# Patient Record
Sex: FEMALE | Race: WHITE | NOT HISPANIC OR LATINO | Employment: FULL TIME | URBAN - METROPOLITAN AREA
[De-identification: names, ages, dates, MRNs, and addresses within clinical notes are randomized per-mention and may not be internally consistent; named-entity substitution may affect disease eponyms.]

---

## 2017-01-03 ENCOUNTER — PATIENT MESSAGE (OUTPATIENT)
Dept: ENDOCRINOLOGY | Facility: CLINIC | Age: 62
End: 2017-01-03

## 2017-01-06 ENCOUNTER — LAB VISIT (OUTPATIENT)
Dept: LAB | Facility: HOSPITAL | Age: 62
End: 2017-01-06
Attending: INTERNAL MEDICINE
Payer: COMMERCIAL

## 2017-01-06 LAB
ALBUMIN SERPL BCP-MCNC: 3.5 G/DL
ALP SERPL-CCNC: 37 U/L
ALT SERPL W/O P-5'-P-CCNC: 10 U/L
ANION GAP SERPL CALC-SCNC: 8 MMOL/L
AST SERPL-CCNC: 13 U/L
BILIRUB SERPL-MCNC: 0.5 MG/DL
BUN SERPL-MCNC: 44 MG/DL
CALCIUM SERPL-MCNC: 9.2 MG/DL
CHLORIDE SERPL-SCNC: 105 MMOL/L
CO2 SERPL-SCNC: 27 MMOL/L
CREAT SERPL-MCNC: 2.2 MG/DL
EST. GFR  (AFRICAN AMERICAN): 26.7 ML/MIN/1.73 M^2
EST. GFR  (NON AFRICAN AMERICAN): 23.2 ML/MIN/1.73 M^2
ESTIMATED AVG GLUCOSE: 140 MG/DL
GLUCOSE SERPL-MCNC: 151 MG/DL
HBA1C MFR BLD HPLC: 6.5 %
POTASSIUM SERPL-SCNC: 5 MMOL/L
PROT SERPL-MCNC: 7.2 G/DL
SODIUM SERPL-SCNC: 140 MMOL/L

## 2017-01-06 PROCEDURE — 83036 HEMOGLOBIN GLYCOSYLATED A1C: CPT

## 2017-01-06 PROCEDURE — 80053 COMPREHEN METABOLIC PANEL: CPT

## 2017-01-06 PROCEDURE — 36415 COLL VENOUS BLD VENIPUNCTURE: CPT

## 2017-01-09 ENCOUNTER — OFFICE VISIT (OUTPATIENT)
Dept: ENDOCRINOLOGY | Facility: CLINIC | Age: 62
End: 2017-01-09
Payer: COMMERCIAL

## 2017-01-09 VITALS
WEIGHT: 293 LBS | BODY MASS INDEX: 48.82 KG/M2 | SYSTOLIC BLOOD PRESSURE: 124 MMHG | DIASTOLIC BLOOD PRESSURE: 80 MMHG | HEIGHT: 65 IN | HEART RATE: 68 BPM

## 2017-01-09 DIAGNOSIS — E11.42 DIABETIC POLYNEUROPATHY ASSOCIATED WITH TYPE 2 DIABETES MELLITUS: ICD-10-CM

## 2017-01-09 DIAGNOSIS — E11.21 DIABETIC NEPHROPATHY ASSOCIATED WITH TYPE 2 DIABETES MELLITUS: ICD-10-CM

## 2017-01-09 DIAGNOSIS — E66.01 OBESITY, MORBID, BMI 40.0-49.9: ICD-10-CM

## 2017-01-09 DIAGNOSIS — E03.9 HYPOTHYROIDISM, UNSPECIFIED TYPE: ICD-10-CM

## 2017-01-09 DIAGNOSIS — I10 ESSENTIAL HYPERTENSION: Chronic | ICD-10-CM

## 2017-01-09 DIAGNOSIS — E11.3599 PROLIFERATIVE DIABETIC RETINOPATHY WITHOUT MACULAR EDEMA ASSOCIATED WITH TYPE 2 DIABETES MELLITUS, UNSPECIFIED LATERALITY: ICD-10-CM

## 2017-01-09 DIAGNOSIS — I25.10 CORONARY ARTERY DISEASE, ANGINA PRESENCE UNSPECIFIED, UNSPECIFIED VESSEL OR LESION TYPE, UNSPECIFIED WHETHER NATIVE OR TRANSPLANTED HEART: Chronic | ICD-10-CM

## 2017-01-09 PROCEDURE — 99999 PR PBB SHADOW E&M-EST. PATIENT-LVL III: CPT | Mod: PBBFAC,,, | Performed by: INTERNAL MEDICINE

## 2017-01-09 PROCEDURE — 3060F POS MICROALBUMINURIA REV: CPT | Mod: S$GLB,,, | Performed by: INTERNAL MEDICINE

## 2017-01-09 PROCEDURE — 1159F MED LIST DOCD IN RCRD: CPT | Mod: S$GLB,,, | Performed by: INTERNAL MEDICINE

## 2017-01-09 PROCEDURE — 3044F HG A1C LEVEL LT 7.0%: CPT | Mod: S$GLB,,, | Performed by: INTERNAL MEDICINE

## 2017-01-09 PROCEDURE — 99214 OFFICE O/P EST MOD 30 MIN: CPT | Mod: S$GLB,,, | Performed by: INTERNAL MEDICINE

## 2017-01-09 PROCEDURE — 3079F DIAST BP 80-89 MM HG: CPT | Mod: S$GLB,,, | Performed by: INTERNAL MEDICINE

## 2017-01-09 PROCEDURE — 3074F SYST BP LT 130 MM HG: CPT | Mod: S$GLB,,, | Performed by: INTERNAL MEDICINE

## 2017-01-09 RX ORDER — AMLODIPINE BESYLATE 5 MG/1
5 TABLET ORAL DAILY
COMMUNITY

## 2017-01-09 RX ORDER — ATORVASTATIN CALCIUM 40 MG/1
40 TABLET, FILM COATED ORAL DAILY
COMMUNITY

## 2017-01-09 NOTE — MR AVS SNAPSHOT
[facility] - Endo/Diab/Metab  [address]  [facility] [address]  Phone: [phone]  Fax: [phone]                  Rochelle Reed   2017 9:30 AM   Office Visit    Description:  Female : 1955   Provider:  Michael Olmstead MD   Department:  [facility] Endo/Diab/Metab           Reason for Visit     Diabetes Mellitus           Diagnoses this Visit        Comments    Uncontrolled type 2 diabetes mellitus with diabetic nephropathy, with long-term current use of insulin    -  Primary     Diabetic nephropathy associated with type 2 diabetes mellitus         Proliferative diabetic retinopathy without macular edema associated with type 2 diabetes mellitus, unspecified laterality         Hypothyroidism, unspecified type         Essential hypertension         Coronary artery disease, angina presence unspecified, unspecified vessel or lesion type, unspecified whether native or transplanted heart                To Do List           Future Appointments        Provider Department Dept Phone    1/10/2017 9:30 AM LAB, [facility] [place] - [facility] [phone]      Goals (5 Years of Data)     None      [facility]     [facility] On Call Nurse Care [facility] -  Assistance  Registered nurses in the [facility] provide clinical advisement, health education, appointment booking, and other advisory services.  Call for this free service at [phone].             Medications           Message regarding Medications     Verify the changes and/or additions to your medication regime listed below are the same as discussed with your clinician today.  If any of these changes or additions are incorrect, please notify your healthcare provider.             Verify that the below list of medications is an accurate representation of the medications you are currently taking.  If none reported, the list may be blank. If incorrect, please contact your healthcare provider. Carry this list with you in case of  "emergency.           Current Medications     amlodipine (NORVASC) 5 MG tablet Take 5 mg by mouth once daily.    aspirin 325 MG tablet 1 Tablet(s) Oral. Every day.      atorvastatin (LIPITOR) 40 MG tablet Take 40 mg by mouth once daily.    blood sugar diagnostic (ONETOUCH ULTRA TEST) Strp TEST BLOOD SUGAR LEVELS FOUR TIMES DAILY TO SIX TIMES A DAY    calcitRIOL (ROCALTROL) 0.25 MCG Cap Take 0.25 mcg by mouth 3 (three) times a week.    carvedilol (COREG) 6.25 MG tablet Take 6.25 mg by mouth 2 (two) times daily.     clopidogrel (PLAVIX) 75 mg tablet Take 75 mg by mouth once daily.    coenzyme Q10 (CO Q-10) 200 mg capsule 1 Capsule(s) Oral.  Twice a day.      ferrous sulfate 325 mg (65 mg iron) Tab tablet Take 1 tablet (325 mg total) by mouth once daily.    furosemide (LASIX) 40 MG tablet TAKE 1 TABLET BY MOUTH ONCE DAILY    insulin aspart (NOVOLOG FLEXPEN) 100 unit/mL InPn pen INJECT 25 UNITS AT BREAKFAST AND LUNCH AND 35 UNITS AT DINNER    insulin detemir (LEVEMIR FLEXTOUCH) 100 unit/mL (3 mL) SubQ InPn pen INJECT 55 UNITS UNDER THE SKIN TWICE DAILY OR AS DIRECTED    levothyroxine (SYNTHROID) 100 MCG tablet Take 1 tablet (100 mcg total) by mouth once daily.    NITROSTAT 0.3 mg SL tablet     omega-3 fatty acids 1,000 mg Cap Take 1 capsule by mouth Daily. 1 Capsule Oral Every day    pen needle, diabetic (NOVOFINE 30) 30 gauge x 1/3" Ndle USE TO INJECT 5 TIMES DAILY    brimonidine 0.2% (ALPHAGAN) 0.2 % Drop Place 1 drop into the right eye 2 (two) times daily.    cycloSPORINE (RESTASIS) 0.05 % ophthalmic emulsion Place 0.05 mLs (1 drop total) into both eyes 2 (two) times daily.    FLUVIRIN [id number] 45 mcg (15 mcg x 3)/0.5 mL Susp     GLUCOSAMINE HCL/GLUC MARTINI (GLUCOSAMINE COMPLEX ORAL) Take 1 tablet by mouth 2 (two) times daily.     KIONEX 15 gram/60 mL Susp     multivitamin (THERAGRAN) per tablet 1 Tablet(s) Oral. Every day.      pitavastatin (LIVALO) 4 mg Tab Take 1 tablet by mouth once daily.           Clinical " "Reference Information           Vital Signs - Last Recorded  Most recent update: 1/9/2017 10:00 AM by Harriett Melendez MA    BP Pulse Ht Wt BMI    124/80 68 5' 5" (1.651 m) 134.7 kg (296 lb 15.4 oz) 49.42 kg/m2      Blood Pressure          Most Recent Value    BP  124/80      Allergies as of 1/9/2017     Ketoconazole      Immunizations Administered on Date of Encounter - 1/9/2017     None      Orders Placed During Today's Visit      Normal Orders This Visit    Ambulatory consult to Optometry     Future Labs/Procedures Expected by Expires    CBC auto differential  1/9/2017 3/10/2018    Hemoglobin A1c  1/9/2017 3/10/2018    TSH  1/9/2017 3/10/2018      "

## 2017-01-09 NOTE — PROGRESS NOTES
Subjective:      Patient ID: Rochelle Sahu is a 61 y.o. female.    Chief Complaint:  Diabetes Mellitus      History of Present Illness  Ms. Sahu presents for follow up of type 2 diabetes. Last seen by CHANCE Lyle on 4/8/2016.    T2DM complications:  Retinopathy - last eye exam 1/2016  Neuropathy - has some numbness/tingling in toes, no foot ulcers  Nephropathy - has CKD stage 4, saw nephrologist last week at , stopped ACEI due to hyperkalemia    She was diagnosed with diabetes in her early 40s     DE 03/2015 with Gayle Schultz RN, CDE    Current Regimen:  Lantus 50 units BID  Humalog I:CHO 1:5, ISF 1:10    Self Reported Glucose levels:  AM: 115-200  Lunch:   Dinner: 140-150     Pt is following low potassium diet per nephrologist.  Weight has been stable  Does some occasional walking at lunchtime at work.    Eating vegan now and trying to avoid excess starch.    no polydipsia or polyuria     She is on lt4 100 mcg qd for hypothyroidism    Started on calcitriol in the last year for secondary hyperparathyroidism - managed by nephrology    Review of Systems   Constitutional: Negative for chills and fever.   Gastrointestinal: Negative for nausea.   No CP  No SOB    Objective:   Physical Exam   Nursing note and vitals reviewed.  Feet no cuts or  scratches  Shoes appropriate  Decreased vibration sense in bilateral feet  + 1 pitting edema in lower ext  No thyromegaly    Lab Review:   Results for ROCHELLE SAHU (MRN [id number]) as of 1/9/2017 10:24   Ref. Range 1/6/2017 10:00   Sodium Latest Ref Range: 136 - 145 mmol/L 140   Potassium Latest Ref Range: 3.5 - 5.1 mmol/L 5.0   Chloride Latest Ref Range: 95 - 110 mmol/L 105   CO2 Latest Ref Range: 23 - 29 mmol/L 27   Anion Gap Latest Ref Range: 8 - 16 mmol/L 8   BUN, Bld Latest Ref Range: 8 - 23 mg/dL 44 (H)   Creatinine Latest Ref Range: 0.5 - 1.4 mg/dL 2.2 (H)   eGFR if non African American Latest Ref Range: >60 mL/min/1.73 m^2 23.2 (A)   eGFR if  Latest  Ref Range: >60 mL/min/1.73 m^2 26.7 (A)   Glucose Latest Ref Range: 70 - 110 mg/dL 151 (H)   Calcium Latest Ref Range: 8.7 - 10.5 mg/dL 9.2   Alkaline Phosphatase Latest Ref Range: 55 - 135 U/L 37 (L)   Total Protein Latest Ref Range: 6.0 - 8.4 g/dL 7.2   Albumin Latest Ref Range: 3.5 - 5.2 g/dL 3.5   Total Bilirubin Latest Ref Range: 0.1 - 1.0 mg/dL 0.5   AST Latest Ref Range: 10 - 40 U/L 13   ALT Latest Ref Range: 10 - 44 U/L 10     Results for ANTONIO SAHU (MRN [id number]) as of 1/9/2017 10:24   Ref. Range 1/26/2015 10:45 4/30/2015 09:37 7/29/2015 09:47 4/5/2016 10:40 1/6/2017 10:00   Hemoglobin A1C Latest Ref Range: 4.5 - 6.2 % 7.6 (H) 8.4 (H) 7.9 (H) 6.8 (H) 6.5 (H)   Estimated Avg Glucose Latest Ref Range: 68 - 131 mg/dL 171 (H) 194 (H) 180 (H) 148 (H) 140 (H)       Assessment:     1. Uncontrolled type 2 diabetes mellitus with diabetic nephropathy, with long-term current use of insulin    2. Diabetic nephropathy associated with type 2 diabetes mellitus    3. Proliferative diabetic retinopathy without macular edema associated with type 2 diabetes mellitus, unspecified laterality    4. Hypothyroidism, unspecified type    5. Essential hypertension    6. Coronary artery disease, angina presence unspecified, unspecified vessel or lesion type, unspecified whether native or transplanted heart    7. Diabetic polyneuropathy associated with type 2 diabetes mellitus    8. Obesity, morbid, BMI 40.0-49.9        Plan:     1.) Patient with type 2 diabetes that is reasonably controlled  --Recent A1c was 6.5% in the setting of anemia so true A1c probably b/w 7.0-7.5%  --A1c goal <7.5%  --Will continue Lantus 50 units BID  --Will continue Humalog at I:CHO of 1:5 and ISF 1:10    2.) Better long term blood glucose control to prevent progression  --Followed by nephrology at [facility]  --ACEI stopped due to hyperkalemia    3.) Better long term blood glucose control to prevent progression  --Due for eye exam now, will schedule    4.)  Continue levothyroxine 100 mcg PO daily  --Will check TSH now    5.)  BP stable on current regimen    6.) Avoid hypoglycemia    7.) Better long term blood glucose control to prevent progression  --Routine foot care    8.) Diet and exercise  --Increases insulin resistance    RTC in 6 months with A1c and CBC prior to appt    Michael Olmstead M.D. Staff Endocrinology

## 2017-01-10 ENCOUNTER — LAB VISIT (OUTPATIENT)
Dept: LAB | Facility: HOSPITAL | Age: 62
End: 2017-01-10
Attending: INTERNAL MEDICINE
Payer: COMMERCIAL

## 2017-01-10 DIAGNOSIS — E03.9 HYPOTHYROIDISM, UNSPECIFIED TYPE: ICD-10-CM

## 2017-01-10 DIAGNOSIS — E11.3599 PROLIFERATIVE DIABETIC RETINOPATHY WITHOUT MACULAR EDEMA ASSOCIATED WITH TYPE 2 DIABETES MELLITUS, UNSPECIFIED LATERALITY: ICD-10-CM

## 2017-01-10 DIAGNOSIS — I25.10 CORONARY ARTERY DISEASE, ANGINA PRESENCE UNSPECIFIED, UNSPECIFIED VESSEL OR LESION TYPE, UNSPECIFIED WHETHER NATIVE OR TRANSPLANTED HEART: Chronic | ICD-10-CM

## 2017-01-10 DIAGNOSIS — I10 ESSENTIAL HYPERTENSION: Chronic | ICD-10-CM

## 2017-01-10 DIAGNOSIS — E11.21 DIABETIC NEPHROPATHY ASSOCIATED WITH TYPE 2 DIABETES MELLITUS: ICD-10-CM

## 2017-01-10 LAB — TSH SERPL DL<=0.005 MIU/L-ACNC: 2.13 UIU/ML

## 2017-01-10 PROCEDURE — 84443 ASSAY THYROID STIM HORMONE: CPT

## 2017-01-10 PROCEDURE — 36415 COLL VENOUS BLD VENIPUNCTURE: CPT | Mod: PO

## 2017-02-07 ENCOUNTER — OFFICE VISIT (OUTPATIENT)
Dept: OPTOMETRY | Facility: CLINIC | Age: 62
End: 2017-02-07
Payer: COMMERCIAL

## 2017-02-07 DIAGNOSIS — H40.1132 PRIMARY OPEN ANGLE GLAUCOMA OF BOTH EYES, MODERATE STAGE: ICD-10-CM

## 2017-02-07 DIAGNOSIS — E11.3593 PROLIFERATIVE DIABETIC RETINOPATHY OF BOTH EYES WITHOUT MACULAR EDEMA ASSOCIATED WITH TYPE 2 DIABETES MELLITUS: Primary | ICD-10-CM

## 2017-02-07 PROCEDURE — 99999 PR PBB SHADOW E&M-EST. PATIENT-LVL II: CPT | Mod: PBBFAC,,, | Performed by: OPTOMETRIST

## 2017-02-07 PROCEDURE — 92014 COMPRE OPH EXAM EST PT 1/>: CPT | Mod: S$GLB,,, | Performed by: OPTOMETRIST

## 2017-02-07 RX ORDER — HEPATITIS B VACCINE (RECOMBINANT) 20 UG/ML
INJECTION, SUSPENSION INTRAMUSCULAR
COMMUNITY
Start: 2016-11-04 | End: 2017-09-05

## 2017-02-07 NOTE — PROGRESS NOTES
HPI     DSL- 01/13/2016   Diabetic eye exam  Pt states no vision change. Vision is stable  Dr. CEE took patient off meds.   Eyemeds  systane OU PRN       Last edited by Kraig Dimas, OD on 2/7/2017 12:08 PM.     ROS     Negative for: Constitutional, Gastrointestinal, Neurological, Skin,   Genitourinary, Musculoskeletal, HENT, Endocrine, Cardiovascular, Eyes,   Respiratory, Psychiatric, Allergic/Imm, Heme/Lymph    Last edited by Kraig Dimas, OD on 2/7/2017 10:57 AM. (History)        Assessment /Plan     For exam results, see Encounter Report.    Proliferative diabetic retinopathy of both eyes without macular edema associated with type 2 diabetes mellitus    Primary open angle glaucoma of both eyes, moderate stage  -     Cruz Visual Field - OU - Intermediate - Both Eyes; Future  -     OCT - Optic Nerve; Future      1. S/P PRP OD, some mild non prolif changes at this time. Mild diabetic retinopathy, no csme. Return in 1 year for dilated eye exam.  2. IOP in teens, may need to restart drops, order HVF(24-2)angelique std and OCT , recheck IOP and decide on restarting drops.

## 2017-04-26 ENCOUNTER — CLINICAL SUPPORT (OUTPATIENT)
Dept: OPHTHALMOLOGY | Facility: CLINIC | Age: 62
End: 2017-04-26
Payer: COMMERCIAL

## 2017-04-26 ENCOUNTER — OFFICE VISIT (OUTPATIENT)
Dept: OPTOMETRY | Facility: CLINIC | Age: 62
End: 2017-04-26
Payer: COMMERCIAL

## 2017-04-26 DIAGNOSIS — H40.1132 PRIMARY OPEN ANGLE GLAUCOMA OF BOTH EYES, MODERATE STAGE: ICD-10-CM

## 2017-04-26 DIAGNOSIS — E11.3593 PROLIFERATIVE DIABETIC RETINOPATHY OF BOTH EYES WITHOUT MACULAR EDEMA ASSOCIATED WITH TYPE 2 DIABETES MELLITUS: ICD-10-CM

## 2017-04-26 DIAGNOSIS — H40.1132 PRIMARY OPEN ANGLE GLAUCOMA OF BOTH EYES, MODERATE STAGE: Primary | ICD-10-CM

## 2017-04-26 PROCEDURE — 92012 INTRM OPH EXAM EST PATIENT: CPT | Mod: S$GLB,,, | Performed by: OPTOMETRIST

## 2017-04-26 PROCEDURE — 92133 CPTRZD OPH DX IMG PST SGM ON: CPT | Mod: S$GLB,,, | Performed by: OPTOMETRIST

## 2017-04-26 PROCEDURE — 92082 INTERMEDIATE VISUAL FIELD XM: CPT | Mod: S$GLB,,, | Performed by: OPTOMETRIST

## 2017-04-26 PROCEDURE — 99999 PR PBB SHADOW E&M-EST. PATIENT-LVL II: CPT | Mod: PBBFAC,,, | Performed by: OPTOMETRIST

## 2017-04-26 NOTE — PROGRESS NOTES
HPI     Here to check iop and tests, consider restart drops.   Here for HVF, OCT, IOP today. Not using any drops  Diabetic  this   AM. History of diabetic retinopathy Vision changes with BS.  Hemoglobin A1C       Date                     Value               Ref Range             Status                01/06/2017               6.5 (H)             4.5 - 6.2 %           Final              Comment:    According to ADA guidelines, hemoglobin A1C <7.0% represents  optimal   control in non-pregnant diabetic patients.  Different  metrics may apply   to specific populations.   Standards of Medical Care in Diabetes -   2016.  For the purpose of screening for the presence of diabetes:  <5.7%       Consistent with the absence of diabetes  5.7-6.4%  Consistent with   increasing risk for diabetes   (prediabetes)  >or=6.5%  Consistent with   diabetes  Currently no consensus exists for use of hemoglobin A1C  for   diagnosis of diabetes for children.         04/05/2016               6.8 (H)             4.5 - 6.2 %           Final                 07/29/2015               7.9 (H)             4.5 - 6.2 %           Final            ----------       Last edited by Kraig Dimas, OD on 4/26/2017 11:31 AM.     ROS     Negative for: Constitutional, Gastrointestinal, Neurological, Skin,   Genitourinary, Musculoskeletal, HENT, Endocrine, Cardiovascular, Eyes,   Respiratory, Psychiatric, Allergic/Imm, Heme/Lymph    Last edited by Kraig Dimas, OD on 4/26/2017 11:21 AM. (History)        Assessment /Plan     For exam results, see Encounter Report.    Primary open angle glaucoma of both eyes, moderate stage    Proliferative diabetic retinopathy of both eyes without macular edema associated with type 2 diabetes mellitus      1. Increase in IOP today. Will recheck IOP in 1-2 mos, if still up will restart on Brimonidine. Prev stopped by Dr. CEE after cat surgery when IOP came down. Pachy thick, no family history of glaucoma, OCT  with thinning OU and VF with defects, some relation to laser for PDR in past. Target IOP 14-16.   2. S/P PRP. Redilate at next visit.

## 2017-05-25 ENCOUNTER — TELEPHONE (OUTPATIENT)
Dept: ENDOCRINOLOGY | Facility: CLINIC | Age: 62
End: 2017-05-25

## 2017-05-25 NOTE — TELEPHONE ENCOUNTER
Left vm message that I have scheduled her for Aug 4 at 4:00PM.  Needs labs prior. Please call back to confirm and schedule her labs.

## 2017-05-25 NOTE — TELEPHONE ENCOUNTER
----- Message from Ijeoma Fisher sent at 5/24/2017  3:41 PM CDT -----  Contact: pt   [phone]  gabriela Curtis  Pt called to make an appt to see the Dr in the month of July   Thanks,

## 2017-07-05 ENCOUNTER — OFFICE VISIT (OUTPATIENT)
Dept: OPTOMETRY | Facility: CLINIC | Age: 62
End: 2017-07-05
Payer: COMMERCIAL

## 2017-07-05 DIAGNOSIS — E11.3593 PROLIFERATIVE DIABETIC RETINOPATHY OF BOTH EYES WITHOUT MACULAR EDEMA ASSOCIATED WITH TYPE 2 DIABETES MELLITUS: Primary | ICD-10-CM

## 2017-07-05 DIAGNOSIS — H40.1132 PRIMARY OPEN ANGLE GLAUCOMA OF BOTH EYES, MODERATE STAGE: ICD-10-CM

## 2017-07-05 PROCEDURE — 99999 PR PBB SHADOW E&M-EST. PATIENT-LVL II: CPT | Mod: PBBFAC,,, | Performed by: OPTOMETRIST

## 2017-07-05 PROCEDURE — 92012 INTRM OPH EXAM EST PATIENT: CPT | Mod: S$GLB,,, | Performed by: OPTOMETRIST

## 2017-07-06 NOTE — PROGRESS NOTES
HPI     Here to check eye pressure and decide on treatment  Blur od at dist, x mos, no assoc pain or red, no relief over time,   constant    Last edited by Kraig Dimas, OD on 7/6/2017  9:34 AM. (History)            Assessment /Plan     For exam results, see Encounter Report.    Proliferative diabetic retinopathy of both eyes without macular edema associated with type 2 diabetes mellitus  -     Ambulatory Referral to Ophthalmology    Primary open angle glaucoma of both eyes, moderate stage      1. Possible csme OD, recent blurred vision. RTC with Saurabh to evaluate.  2. IOP lower today, hold off on treatment,  Will recheck in 3 mos, if still up will restart on Brimonidine. Prev stopped by Dr. CEE after cat surgery when IOP came down. Pachy thick, no family history of glaucoma, OCT with thinning OU and VF with defects, some relation to PRP laser for PDR in past. Pachy thick.

## 2017-07-14 ENCOUNTER — PATIENT MESSAGE (OUTPATIENT)
Dept: ENDOCRINOLOGY | Facility: CLINIC | Age: 62
End: 2017-07-14

## 2017-07-19 ENCOUNTER — LAB VISIT (OUTPATIENT)
Dept: LAB | Facility: HOSPITAL | Age: 62
End: 2017-07-19
Attending: INTERNAL MEDICINE
Payer: COMMERCIAL

## 2017-07-19 DIAGNOSIS — I10 ESSENTIAL HYPERTENSION: Chronic | ICD-10-CM

## 2017-07-19 DIAGNOSIS — E03.9 HYPOTHYROIDISM, UNSPECIFIED TYPE: ICD-10-CM

## 2017-07-19 DIAGNOSIS — E11.21 DIABETIC NEPHROPATHY ASSOCIATED WITH TYPE 2 DIABETES MELLITUS: ICD-10-CM

## 2017-07-19 DIAGNOSIS — E11.3599 PROLIFERATIVE DIABETIC RETINOPATHY WITHOUT MACULAR EDEMA ASSOCIATED WITH TYPE 2 DIABETES MELLITUS, UNSPECIFIED LATERALITY: ICD-10-CM

## 2017-07-19 DIAGNOSIS — I25.10 CORONARY ARTERY DISEASE, ANGINA PRESENCE UNSPECIFIED, UNSPECIFIED VESSEL OR LESION TYPE, UNSPECIFIED WHETHER NATIVE OR TRANSPLANTED HEART: Chronic | ICD-10-CM

## 2017-07-19 LAB
BASOPHILS # BLD AUTO: 0.02 K/UL
BASOPHILS NFR BLD: 0.3 %
DIFFERENTIAL METHOD: ABNORMAL
EOSINOPHIL # BLD AUTO: 0.3 K/UL
EOSINOPHIL NFR BLD: 4.6 %
ERYTHROCYTE [DISTWIDTH] IN BLOOD BY AUTOMATED COUNT: 14.9 %
HCT VFR BLD AUTO: 31.4 %
HGB BLD-MCNC: 9.8 G/DL
LYMPHOCYTES # BLD AUTO: 0.8 K/UL
LYMPHOCYTES NFR BLD: 11.6 %
MCH RBC QN AUTO: 30.3 PG
MCHC RBC AUTO-ENTMCNC: 31.2 G/DL
MCV RBC AUTO: 97 FL
MONOCYTES # BLD AUTO: 0.8 K/UL
MONOCYTES NFR BLD: 12.2 %
NEUTROPHILS # BLD AUTO: 4.7 K/UL
NEUTROPHILS NFR BLD: 71.1 %
PLATELET # BLD AUTO: 217 K/UL
PMV BLD AUTO: 10 FL
RBC # BLD AUTO: 3.23 M/UL
WBC # BLD AUTO: 6.55 K/UL

## 2017-07-19 PROCEDURE — 83036 HEMOGLOBIN GLYCOSYLATED A1C: CPT

## 2017-07-19 PROCEDURE — 36415 COLL VENOUS BLD VENIPUNCTURE: CPT | Mod: PO

## 2017-07-19 PROCEDURE — 85025 COMPLETE CBC W/AUTO DIFF WBC: CPT

## 2017-07-20 ENCOUNTER — INITIAL CONSULT (OUTPATIENT)
Dept: OPHTHALMOLOGY | Facility: CLINIC | Age: 62
End: 2017-07-20
Payer: COMMERCIAL

## 2017-07-20 DIAGNOSIS — E11.3392 CONTROLLED TYPE 2 DIABETES MELLITUS WITH LEFT EYE AFFECTED BY MODERATE NONPROLIFERATIVE RETINOPATHY WITHOUT MACULAR EDEMA, WITH LONG-TERM CURRENT USE OF INSULIN: ICD-10-CM

## 2017-07-20 DIAGNOSIS — E11.3511 CONTROLLED TYPE 2 DIABETES MELLITUS WITH RIGHT EYE AFFECTED BY PROLIFERATIVE RETINOPATHY AND MACULAR EDEMA, WITH LONG-TERM CURRENT USE OF INSULIN: Primary | ICD-10-CM

## 2017-07-20 DIAGNOSIS — Z79.4 CONTROLLED TYPE 2 DIABETES MELLITUS WITH RIGHT EYE AFFECTED BY PROLIFERATIVE RETINOPATHY AND MACULAR EDEMA, WITH LONG-TERM CURRENT USE OF INSULIN: Primary | ICD-10-CM

## 2017-07-20 DIAGNOSIS — Z79.4 CONTROLLED TYPE 2 DIABETES MELLITUS WITH LEFT EYE AFFECTED BY MODERATE NONPROLIFERATIVE RETINOPATHY WITHOUT MACULAR EDEMA, WITH LONG-TERM CURRENT USE OF INSULIN: ICD-10-CM

## 2017-07-20 LAB
ESTIMATED AVG GLUCOSE: 126 MG/DL
HBA1C MFR BLD HPLC: 6 %

## 2017-07-20 PROCEDURE — 67028 INJECTION EYE DRUG: CPT | Mod: RT,S$GLB,, | Performed by: OPHTHALMOLOGY

## 2017-07-20 PROCEDURE — 99999 PR PBB SHADOW E&M-EST. PATIENT-LVL II: CPT | Mod: PBBFAC,,, | Performed by: OPHTHALMOLOGY

## 2017-07-20 PROCEDURE — 92134 CPTRZ OPH DX IMG PST SGM RTA: CPT | Mod: S$GLB,,, | Performed by: OPHTHALMOLOGY

## 2017-07-20 PROCEDURE — 92014 COMPRE OPH EXAM EST PT 1/>: CPT | Mod: 25,S$GLB,, | Performed by: OPHTHALMOLOGY

## 2017-07-20 RX ADMIN — Medication 1.25 MG: at 05:07

## 2017-07-20 NOTE — PROGRESS NOTES
HPI     Eye Problem    Additional comments: Referred by Dr. Dimas           Comments   DLS  7/5/17 Dr. Dimas    Pt states she is here for bleeding behind her eyes. VA OD is not as clear as OS.  HX of retinal diabetic lasers OU by Dr.Stephen Coleman(general ophthalmologist)  approx 7 yrs ago.  + floaters at times.  Denies flashes  + DM since mid 90's  Last A1 c yesterday  6.0  Eye meds: None  S/P PC IOL OU Dr. Coleman        OCT - OD DME  OS - No ME      A/P    1.PDR OD - s/p PRP Mod NPDR OS  T2 - controlled  WIth ME OU -     DME OD today    Avastin OD    2. HTN Ret OU  BS/BP/chol control    3. PCIOL OU      1 month OCT/FA OD    Risks, benefits, and alternatives to treatment discussed in detail with the patient.  The patient voiced understanding and wished to proceed with the procedure    Injection Procedure Note:  Diagnosis: DME OD    Topical Proparacaine and Betadine.  Inject Avastin OD at 6:00 @ 3.5-4mm posterior to limbus  Post Operative Dx: Same  Complications: None  Follow up as above.

## 2017-07-20 NOTE — LETTER
July 20, 2017      Kraig Dimas, OD  [address] [place] [address]           [facility]  [address] [facility] [address] [place] [address]  Phone: [phone]  Fax: [phone]          Patient: Rochelle Reed   MR Number: [id number]   YOB: 1955   Date of Visit: 7/20/2017       Dear Dr. Kraig Dimas:    Thank you for referring Rochelle Reed to me for evaluation. Attached you will find relevant portions of my assessment and plan of care.    If you have questions, please do not hesitate to call me. I look forward to following Rochelle Reed along with you.    Sincerely,    GARDENIA Wiley MD    Enclosure  CC:  No Recipients    If you would like to receive this communication electronically, please contact [email or web address] or [phone] to request more information on [employer] Link access.    For providers and/or their staff who would like to refer a patient to [facility], please contact us through our one-stop-shop provider referral line, [facility], at [phone].    If you feel you have received this communication in error or would no longer like to receive these types of communications, please e-mail [email or web address]

## 2017-07-24 ENCOUNTER — PATIENT MESSAGE (OUTPATIENT)
Dept: OPHTHALMOLOGY | Facility: CLINIC | Age: 62
End: 2017-07-24

## 2017-08-01 RX ORDER — INSULIN ASPART 100 [IU]/ML
INJECTION, SOLUTION INTRAVENOUS; SUBCUTANEOUS
Qty: 30 ML | Refills: 6 | Status: SHIPPED | OUTPATIENT
Start: 2017-08-01 | End: 2018-07-18 | Stop reason: SDUPTHER

## 2017-08-03 DIAGNOSIS — E03.9 HYPOTHYROIDISM: ICD-10-CM

## 2017-08-03 RX ORDER — LEVOTHYROXINE SODIUM 100 UG/1
TABLET ORAL
Qty: 90 TABLET | Refills: 2 | Status: SHIPPED | OUTPATIENT
Start: 2017-08-03 | End: 2018-05-19 | Stop reason: SDUPTHER

## 2017-08-04 ENCOUNTER — OFFICE VISIT (OUTPATIENT)
Dept: ENDOCRINOLOGY | Facility: CLINIC | Age: 62
End: 2017-08-04
Payer: COMMERCIAL

## 2017-08-04 VITALS
SYSTOLIC BLOOD PRESSURE: 130 MMHG | HEIGHT: 65 IN | WEIGHT: 292.13 LBS | BODY MASS INDEX: 48.67 KG/M2 | DIASTOLIC BLOOD PRESSURE: 70 MMHG | RESPIRATION RATE: 16 BRPM | HEART RATE: 80 BPM

## 2017-08-04 DIAGNOSIS — E11.3513 CONTROLLED TYPE 2 DIABETES MELLITUS WITH BOTH EYES AFFECTED BY PROLIFERATIVE RETINOPATHY AND MACULAR EDEMA, WITH LONG-TERM CURRENT USE OF INSULIN: Primary | ICD-10-CM

## 2017-08-04 DIAGNOSIS — E11.3593 PROLIFERATIVE DIABETIC RETINOPATHY OF BOTH EYES WITHOUT MACULAR EDEMA ASSOCIATED WITH TYPE 2 DIABETES MELLITUS: ICD-10-CM

## 2017-08-04 DIAGNOSIS — Z79.4 CONTROLLED TYPE 2 DIABETES MELLITUS WITH BOTH EYES AFFECTED BY PROLIFERATIVE RETINOPATHY AND MACULAR EDEMA, WITH LONG-TERM CURRENT USE OF INSULIN: Primary | ICD-10-CM

## 2017-08-04 DIAGNOSIS — I10 ESSENTIAL HYPERTENSION: Chronic | ICD-10-CM

## 2017-08-04 DIAGNOSIS — R60.9 EDEMA, UNSPECIFIED TYPE: ICD-10-CM

## 2017-08-04 DIAGNOSIS — E11.21 DIABETIC NEPHROPATHY ASSOCIATED WITH TYPE 2 DIABETES MELLITUS: ICD-10-CM

## 2017-08-04 DIAGNOSIS — E66.01 OBESITY, MORBID, BMI 40.0-49.9: ICD-10-CM

## 2017-08-04 DIAGNOSIS — E03.9 HYPOTHYROIDISM, UNSPECIFIED TYPE: ICD-10-CM

## 2017-08-04 DIAGNOSIS — I25.10 CORONARY ARTERY DISEASE, ANGINA PRESENCE UNSPECIFIED, UNSPECIFIED VESSEL OR LESION TYPE, UNSPECIFIED WHETHER NATIVE OR TRANSPLANTED HEART: Chronic | ICD-10-CM

## 2017-08-04 DIAGNOSIS — E11.42 DIABETIC POLYNEUROPATHY ASSOCIATED WITH TYPE 2 DIABETES MELLITUS: ICD-10-CM

## 2017-08-04 PROBLEM — E11.3392: Status: RESOLVED | Noted: 2017-07-20 | Resolved: 2017-08-04

## 2017-08-04 PROCEDURE — 3008F BODY MASS INDEX DOCD: CPT | Mod: S$GLB,,, | Performed by: INTERNAL MEDICINE

## 2017-08-04 PROCEDURE — 99214 OFFICE O/P EST MOD 30 MIN: CPT | Mod: S$GLB,,, | Performed by: INTERNAL MEDICINE

## 2017-08-04 PROCEDURE — 3044F HG A1C LEVEL LT 7.0%: CPT | Mod: S$GLB,,, | Performed by: INTERNAL MEDICINE

## 2017-08-04 PROCEDURE — 99999 PR PBB SHADOW E&M-EST. PATIENT-LVL III: CPT | Mod: PBBFAC,,, | Performed by: INTERNAL MEDICINE

## 2017-08-04 RX ORDER — ERGOCALCIFEROL 1.25 MG/1
CAPSULE ORAL
COMMUNITY
Start: 2017-07-30

## 2017-08-04 NOTE — PROGRESS NOTES
Subjective:      Patient ID: Rochelle Reed is a 61 y.o. female.    Chief Complaint:  Diabetes Mellitus      History of Present Illness  Ms. Reed presents for follow up of type 2 diabetes. Last seen by 1/2017.     T2DM complications:  Proliferative Retinopathy - last eye exam 7/2017  Neuropathy - has some numbness/tingling in toes, no foot ulcers  Nephropathy - has CKD stage 4, Sees nephrology at  - Glynn Harris MD    She was diagnosed with diabetes in her early 40s     DE 03/2015 with Gayle Schultz RN, CDE     Current Regimen:  Lantus 45 units BID   Humalog I:CHO 1:5, ISF 1:10     Reviewed glucose logs:  AM: 134-170  Lunch:   Dinner:   Bedtime: 191-248    She decreased the Lantus as she was having late night/early am hypoglycemia (didn't check glucose during an episode)     Pt is following low potassium diet per nephrologist.  Weight has been stable  Does some occasional walking at lunchtime at work.     Eating vegan and avoiding animal protein. Eating low carb.     no polydipsia or polyuria     She is on lt4 100 mcg qd for hypothyroidism     Started on calcitriol in the last year for secondary hyperparathyroidism - managed by nephrology    Patient with chronic lower extremity edema that worsened after she took a few doses of steroids for respiratory infection.    Review of Systems   Constitutional: Negative for chills and fever.   Gastrointestinal: Negative for nausea.       Objective:   Physical Exam   Nursing note and vitals reviewed.  No thyromegaly  No tremor  DTR's 2 +  Lungs CTA b/l  + 2 pitting edema  Mild hyperpigmentation of left lower ext, no increased erythema or warmth    Lab Review:   Results for ROCHELLE REED (MRN [id number]) as of 8/4/2017 13:06   Ref. Range 7/19/2017 11:03   WBC Latest Ref Range: 3.90 - 12.70 K/uL 6.55   RBC Latest Ref Range: 4.00 - 5.40 M/uL 3.23 (L)   Hemoglobin Latest Ref Range: 12.0 - 16.0 g/dL 9.8 (L)   Hematocrit Latest Ref Range: 37.0 - 48.5 % 31.4 (L)    MCV Latest Ref Range: 82 - 98 fL 97   MCH Latest Ref Range: 27.0 - 31.0 pg 30.3   MCHC Latest Ref Range: 32.0 - 36.0 g/dL 31.2 (L)   RDW Latest Ref Range: 11.5 - 14.5 % 14.9 (H)   Platelets Latest Ref Range: 150 - 350 K/uL 217   MPV Latest Ref Range: 9.2 - 12.9 fL 10.0   Gran% Latest Ref Range: 38.0 - 73.0 % 71.1   Gran # Latest Ref Range: 1.8 - 7.7 K/uL 4.7   Lymph% Latest Ref Range: 18.0 - 48.0 % 11.6 (L)   Lymph # Latest Ref Range: 1.0 - 4.8 K/uL 0.8 (L)   Mono% Latest Ref Range: 4.0 - 15.0 % 12.2   Mono # Latest Ref Range: 0.3 - 1.0 K/uL 0.8   Eosinophil% Latest Ref Range: 0.0 - 8.0 % 4.6   Eos # Latest Ref Range: 0.0 - 0.5 K/uL 0.3   Basophil% Latest Ref Range: 0.0 - 1.9 % 0.3   Baso # Latest Ref Range: 0.00 - 0.20 K/uL 0.02     Results for ANTONIO SAHU (MRN [id number]) as of 8/4/2017 13:06   Ref. Range 7/29/2015 09:47 4/5/2016 10:40 1/6/2017 10:00 1/10/2017 10:04 7/19/2017 11:03   Hemoglobin A1C Latest Ref Range: 4.0 - 5.6 % 7.9 (H) 6.8 (H) 6.5 (H)  6.0 (H)   Estimated Avg Glucose Latest Ref Range: 68 - 131 mg/dL 180 (H) 148 (H) 140 (H)  126   TSH Latest Ref Range: 0.400 - 4.000 uIU/mL 2.729   2.127        Assessment:     1. Controlled type 2 diabetes mellitus with both eyes affected by proliferative retinopathy and macular edema, with long-term current use of insulin    2. Diabetic nephropathy associated with type 2 diabetes mellitus    3. Proliferative diabetic retinopathy of both eyes without macular edema associated with type 2 diabetes mellitus    4. Hypothyroidism, unspecified type    5. Essential hypertension    6. Coronary artery disease, angina presence unspecified, unspecified vessel or lesion type, unspecified whether native or transplanted heart    7. Diabetic polyneuropathy associated with type 2 diabetes mellitus    8. Obesity, morbid, BMI 40.0-49.9    9. Edema, unspecified type        Plan:     1.) Patient with type 2 diabetes that is reasonably controlled  --Recent A1c was 6.0% in the setting  of anemia so true A1c probably higher  --A1c goal <7.5%  --Will continue Lantus 45 units BID  --Will continue Humalog at I:CHO of 1:5 and ISF 1:10  --Will check A1c and fructosamine with next set of labs     2.) Better long term blood glucose control to prevent progression  --Followed by nephrology at   --ACEI stopped due to hyperkalemia     3.) Better long term blood glucose control to prevent progression  --UTD with eye exam     4.) Continue levothyroxine 100 mcg PO daily  --Will check TSH at next visit     5.)  BP stable on current regimen     6.) Avoid hypoglycemia     7.) Better long term blood glucose control to prevent progression  --Routine foot care     8.) Diet and exercise  --Increases insulin resistance    9.) Suspect possible chronic venous disease  --will refer to vascular medicine    Advised patient that she needs to re establish care with a PCP, she has the name of a PCP closer to home and she will schedule an appt     RTC in 6 months with A1c and CBC prior to appt     Michael Olmstead M.D. Staff Endocrinology

## 2017-08-07 ENCOUNTER — TELEPHONE (OUTPATIENT)
Dept: ENDOCRINOLOGY | Facility: CLINIC | Age: 62
End: 2017-08-07

## 2017-08-07 NOTE — TELEPHONE ENCOUNTER
----- Message from Michael Olmstead MD sent at 8/4/2017  5:00 PM CDT -----  Please change consult to vascular medicine and not vascular surgery. Patient does not need to see surgeon. Needs vascular medicine. Thank you.

## 2017-08-18 ENCOUNTER — PATIENT MESSAGE (OUTPATIENT)
Dept: ENDOCRINOLOGY | Facility: CLINIC | Age: 62
End: 2017-08-18

## 2017-08-21 ENCOUNTER — PROCEDURE VISIT (OUTPATIENT)
Dept: OPHTHALMOLOGY | Facility: CLINIC | Age: 62
End: 2017-08-21
Payer: COMMERCIAL

## 2017-08-21 VITALS — DIASTOLIC BLOOD PRESSURE: 59 MMHG | HEART RATE: 68 BPM | SYSTOLIC BLOOD PRESSURE: 177 MMHG

## 2017-08-21 DIAGNOSIS — E11.3513 CONTROLLED TYPE 2 DIABETES MELLITUS WITH BOTH EYES AFFECTED BY PROLIFERATIVE RETINOPATHY AND MACULAR EDEMA, WITH LONG-TERM CURRENT USE OF INSULIN: Primary | ICD-10-CM

## 2017-08-21 DIAGNOSIS — H35.033 HYPERTENSIVE RETINOPATHY OF BOTH EYES: ICD-10-CM

## 2017-08-21 DIAGNOSIS — Z79.4 CONTROLLED TYPE 2 DIABETES MELLITUS WITH BOTH EYES AFFECTED BY PROLIFERATIVE RETINOPATHY AND MACULAR EDEMA, WITH LONG-TERM CURRENT USE OF INSULIN: Primary | ICD-10-CM

## 2017-08-21 DIAGNOSIS — E11.3511 CONTROLLED TYPE 2 DIABETES MELLITUS WITH RIGHT EYE AFFECTED BY PROLIFERATIVE RETINOPATHY AND MACULAR EDEMA, WITH LONG-TERM CURRENT USE OF INSULIN: ICD-10-CM

## 2017-08-21 DIAGNOSIS — Z79.4 CONTROLLED TYPE 2 DIABETES MELLITUS WITH RIGHT EYE AFFECTED BY PROLIFERATIVE RETINOPATHY AND MACULAR EDEMA, WITH LONG-TERM CURRENT USE OF INSULIN: ICD-10-CM

## 2017-08-21 PROCEDURE — 92134 CPTRZ OPH DX IMG PST SGM RTA: CPT | Mod: S$GLB,,, | Performed by: OPHTHALMOLOGY

## 2017-08-21 PROCEDURE — 92014 COMPRE OPH EXAM EST PT 1/>: CPT | Mod: 25,S$GLB,, | Performed by: OPHTHALMOLOGY

## 2017-08-21 PROCEDURE — 67028 INJECTION EYE DRUG: CPT | Mod: RT,S$GLB,, | Performed by: OPHTHALMOLOGY

## 2017-08-21 PROCEDURE — 92235 FLUORESCEIN ANGRPH MLTIFRAME: CPT | Mod: S$GLB,,, | Performed by: OPHTHALMOLOGY

## 2017-08-21 RX ADMIN — Medication 1.25 MG: at 04:08

## 2017-08-21 NOTE — PROGRESS NOTES
HPI     DLS  7/20/17  Pt states OD has been giving her some problem since the   injection.  + DM since mid 90's  Last A1 c yesterday  6.0  Eye meds: None  S/P PC IOL OU Dr. Coleman       HPI     Eye Problem    Additional comments: Referred by Dr. Dimas           Comments   DLS  7/5/17 Dr. Dimas    Pt states she is here for bleeding behind her eyes. VA OD is not as clear as OS.  HX of retinal diabetic lasers OU by Dr.Stephen Coleman(general ophthalmologist)  approx 7 yrs ago.  + floaters at times.  Denies flashes  + DM since mid 90's  Last A1 c yesterday  6.0  Eye meds: None  S/P PC IOL OU Dr. Coleman        OCT - OD DME  OS - No ME      A/P    1.PDR OD - s/p PRP Mod NPDR OS  T2 - controlled  WIth ME OU -   S/p Avastin OD x 1    DME OD today    Avastin OD    2. HTN Ret OU  BS/BP/chol control    3. PCIOL OU      1 month OCT    Risks, benefits, and alternatives to treatment discussed in detail with the patient.  The patient voiced understanding and wished to proceed with the procedure    Injection Procedure Note:  Diagnosis: DME OD    Topical Proparacaine and Betadine.  Inject Avastin OD at 6:00 @ 3.5-4mm posterior to limbus  Post Operative Dx: Same  Complications: None  Follow up as above.

## 2017-08-21 NOTE — PATIENT INSTRUCTIONS
Fluorescein Angiography     A fluorescein angiogram of the retina   Fluorescein angiography is an eye test. It is done to look at the back of your eye, including:  · The blood vessels in your eye  · The layer of tissue at the back of your eye (the retina)  · The center of your retina (the macula)  · The optic nerve  This test can diagnose diseases found in these areas. It can also diagnose other conditions that affect these areas. To do this test, a dye called fluorescein is shot (injected) into your arm. The dye goes into your bloodstream and up into the blood vessels in your eyes. A special camera is then used to take images (angiograms) of your eyes.  Getting ready for your test  Tell your healthcare provider if you:  · Are pregnant or think you may be pregnant  · Are breastfeeding  · Have a history of severe allergic reactions, including to X-ray dye or other medicines  · Have kidney problems  Tell your provider about any medicines you are taking. You may need to stop taking all or some of these before the test. This includes:  · All prescription medicines  · Over-the-counter medicines such as aspirin or ibuprofen  · Street drugs  · Herbs, vitamins, and other supplements  You should arrange for an adult family member or friend to drive you home after your test. Your vision will be blurry for up to 12 hours.  Follow any other instructions from your healthcare provider.  During your test  · You are given eye drops to enlarge (dilate) your pupils.  · You then sit in front of a special camera. You place your chin on the chin rest and look into the camera.  · Images are taken of your eyes, one eye at a time.  · Fluorescein dye is then injected into your arm. The lights in the room are turned off. You may have mild nausea. You may have a warm feeling in your arm or upper body. Tell your provider if your skin feels itchy or if you are having trouble breathing. If so, you could be having an allergic reaction to the  dye.  · More pictures of your eyes are taken over 15 to 30 minutes. The camera shines a bright light into your eyes. Try to keep your head still and your eyes open.  · When enough images have been taken, the test is over.  After your test  Your vision will be blurry for up to 4 to 12 hours. This is because of your dilated pupils. Your eye will be more sensitive to light for up to 12 hours. You may want to wear sunglasses during this time. Do not drive if your vision is very blurry. You may also find it uncomfortable to read. Your skin may look yellow for a few hours. This is from the dye. Your urine will be bright yellow or orange for 24 to 48 hours after the test.     Risks and possible complications  All procedures have some risks. Possible risks of fluorescein angiography include:  · Upset stomach (nausea) and vomiting  · Leaking dye around the injection site that causes pain and swelling  · Metallic taste in your mouth  · Infection at injection site  · Allergic reaction to the dye  · Dry mouth or too much saliva  · Faster heart rate  · Sweating  · Lower back pain   Date Last Reviewed: 5/30/2015  © 0269-9066 [employer]. [address] [place] [address]. All rights reserved. This information is not intended as a substitute for professional medical care. Always follow your healthcare professional's instructions.      .POST INTRAVITREAL INJECTION PATIENT INSTRUCTIONS   Below are some guidelines for what to expect after your treatment and additional care instructions.   * you may experience mild discomfort in your eye after the treatment. Your eye usually feels better within 24 to 48 hours after the procedure.   * you have been given drops that numb the surface of your eye.   DO NOT rub or touch your eye, DO NOT wear contacts until numbness goes away.   * you may experience small spots that appear in your field of vision, these are usually caused by an air bubble or from the medication. It  taked a few hours or days for these to go away.   * use of sunglasses will help reduce light sensitivity and glare.   * DO NOT swim or put sink water ( tap water ) or swin for at least 24 hours after the injection   * If you have a gritty, burning, irritating or stinging feeling in the injected eye. This may be a result of the antiseptic used. Use artifical tears or eye lubricant ( from over- the- counter from your local pharmacy ). If you have some at home already please check the expiration date, so not to use anything contaminated in your eye. A cool pack over your eye brow above the injected eye may also relieve discomfort.   Call us right away or go to the Emergency Department if you have a dramatic decrease in vision or extreme pain in the eye.   [facility] [phone]

## 2017-09-05 ENCOUNTER — INITIAL CONSULT (OUTPATIENT)
Dept: CARDIOLOGY | Facility: CLINIC | Age: 62
End: 2017-09-05
Payer: COMMERCIAL

## 2017-09-05 VITALS
SYSTOLIC BLOOD PRESSURE: 188 MMHG | BODY MASS INDEX: 48.82 KG/M2 | OXYGEN SATURATION: 95 % | HEART RATE: 60 BPM | WEIGHT: 293 LBS | HEIGHT: 65 IN | DIASTOLIC BLOOD PRESSURE: 76 MMHG

## 2017-09-05 DIAGNOSIS — I87.2 VENOUS STASIS DERMATITIS OF BOTH LOWER EXTREMITIES: ICD-10-CM

## 2017-09-05 DIAGNOSIS — R60.0 BILATERAL LEG EDEMA: ICD-10-CM

## 2017-09-05 DIAGNOSIS — E11.42 DIABETIC POLYNEUROPATHY ASSOCIATED WITH TYPE 2 DIABETES MELLITUS: Primary | ICD-10-CM

## 2017-09-05 DIAGNOSIS — E66.01 OBESITY, MORBID, BMI 40.0-49.9: ICD-10-CM

## 2017-09-05 PROCEDURE — 99244 OFF/OP CNSLTJ NEW/EST MOD 40: CPT | Mod: S$GLB,,, | Performed by: INTERNAL MEDICINE

## 2017-09-05 PROCEDURE — 99999 PR PBB SHADOW E&M-EST. PATIENT-LVL III: CPT | Mod: PBBFAC,,, | Performed by: INTERNAL MEDICINE

## 2017-09-05 RX ORDER — CEPHRADINE 500 MG
200 CAPSULE ORAL 2 TIMES DAILY
COMMUNITY

## 2017-09-05 RX ORDER — MULTIVIT WITH MINERALS/HERBS
1 TABLET ORAL DAILY
COMMUNITY

## 2017-09-05 NOTE — PROGRESS NOTES
Interventional Cardiology Clinic Note  Reason for Visit: Consult for leg swelling    HPI:   Ms. Rochelle Reed is a 61 y.o. woman with history of CHF, HTN, CKD stage IV, anemia of CKD, and DM type 2 with peripheral neuropathy presents to the interventional clinic today for a consult for her leg discoloration and concern for venous stasis. The patient notes that her sx started two months ago once she noticed that she had acute red discoloration of her right leg anteriorly just above the ankle after she had taken steroids and a z-emery. She had stopped her steroids and then since then has elevated her legs to help with leg edema. She also takes 40 mg daily of lasix and 5 mg daily of norvasc. She notes that she has exquisitely tender legs bilaterally and has some resolving blisters located over these areas bilaterally. She notes that in the morning once she wakes up her leg swelling is much better. She denies any spider veins. She notes that she has severe peripheral neuropathy.     She has baseline ZEE with NYHA class II sx. Previous echo done here in 2012 shows normal LVEF with mild diastolic dysfunction. She denies any CP and denies Orthopnea, claudication, or PND.      ROS:    Review of Systems   Constitution: Negative for chills and fever.   HENT: Negative for congestion and sore throat.    Eyes: Negative for pain, vision loss in right eye and visual disturbance.   Cardiovascular:        As per HPI   Respiratory: Positive for shortness of breath (baseline is NYHA II). Negative for cough.    Skin: Positive for color change (As per HPI) and rash (as per HPI).   Musculoskeletal: Positive for arthritis and joint pain. Negative for falls.   Gastrointestinal: Negative for abdominal pain, diarrhea, nausea and vomiting.   Genitourinary: Negative for dysuria and hematuria.   Neurological: Positive for paresthesias. Negative for dizziness and loss of balance.     PMH:     Past Medical History:   Diagnosis Date     Cataract cortical, senile     CHF (congestive heart failure)     Coronary artery disease     Diabetic nephropathy     DR (diabetic retinopathy)     Hyperlipidemia     Hypertension     Hypothyroid     Morbid obesity 7/26/2012    Obesity     PN (peripheral neuropathy)     Primary open-angle glaucoma, moderate stage 9/30/2015    Type 2 diabetes mellitus not at goal      Past Surgical History:   Procedure Laterality Date    APPENDECTOMY      CARDIAC CATHETERIZATION      CATARACT EXTRACTION W/  INTRAOCULAR LENS IMPLANT Right 1/24/2011    Dr. Coleman  right eye    CATARACT EXTRACTION W/  INTRAOCULAR LENS IMPLANT Left 2/7/2011    Dr. Coleman left eye    CORONARY ANGIOPLASTY      MULTIPLE TOOTH EXTRACTIONS      stents  2003    cordis stetns     Allergies:     Review of patient's allergies indicates:   Allergen Reactions    Ketoconazole      Other reaction(s): Swelling     Medications:     Current Outpatient Prescriptions on File Prior to Visit   Medication Sig Dispense Refill    amlodipine (NORVASC) 5 MG tablet Take 5 mg by mouth once daily.      aspirin 325 MG tablet 1 Tablet(s) Oral. Every day.        atorvastatin (LIPITOR) 40 MG tablet Take 40 mg by mouth once daily.      blood sugar diagnostic (ONETOUCH ULTRA TEST) Strp TEST BLOOD SUGAR LEVELS FOUR TIMES DAILY TO SIX TIMES A  strip 10    calcitRIOL (ROCALTROL) 0.25 MCG Cap Take 0.25 mcg by mouth 3 (three) times a week.      carvedilol (COREG) 6.25 MG tablet Take 6.25 mg by mouth 2 (two) times daily.       clopidogrel (PLAVIX) 75 mg tablet Take 75 mg by mouth once daily.      coenzyme Q10 (CO Q-10) 200 mg capsule 1 Capsule(s) Oral.  Twice a day.        ergocalciferol (ERGOCALCIFEROL) 50,000 unit Cap       ferrous sulfate 325 mg (65 mg iron) Tab tablet Take 1 tablet (325 mg total) by mouth once daily. 30 tablet 0    furosemide (LASIX) 40 MG tablet TAKE 1 TABLET BY MOUTH ONCE DAILY 90 tablet 0    insulin aspart (NOVOLOG FLEXPEN) 100  "unit/mL InPn pen INJECT 25 UNITS AT BREAKFAST AND LUNCH AND 35 UNITS AT DINNER 30 mL 6    insulin detemir (LEVEMIR FLEXTOUCH) 100 unit/mL (3 mL) SubQ InPn pen INJECT 55 UNITS UNDER THE SKIN TWICE DAILY OR AS DIRECTED 45 mL 6    levothyroxine (SYNTHROID) 100 MCG tablet TAKE 1 TABLET(100 MCG) BY MOUTH EVERY DAY 90 tablet 2    NITROSTAT 0.3 mg SL tablet       omega-3 fatty acids 1,000 mg Cap Take 1 capsule by mouth Daily. 1 Capsule Oral Every day      pen needle, diabetic (NOVOFINE 30) 30 gauge x 1/3" Ndle USE TO INJECT 5 TIMES DAILY 100 each 6    [DISCONTINUED] brimonidine 0.2% (ALPHAGAN) 0.2 % Drop Place 1 drop into the right eye 2 (two) times daily. 5 mL 6    [DISCONTINUED] cycloSPORINE (RESTASIS) 0.05 % ophthalmic emulsion Place 0.05 mLs (1 drop total) into both eyes 2 (two) times daily. 30 each 11    [DISCONTINUED] ENGERIX-B, PF, 20 mcg/mL Syrg       [DISCONTINUED] GLUCOSAMINE HCL/GLUC MARTINI (GLUCOSAMINE COMPLEX ORAL) Take 1 tablet by mouth 2 (two) times daily.       [DISCONTINUED] KIONEX 15 gram/60 mL Susp       [DISCONTINUED] multivitamin (THERAGRAN) per tablet 1 Tablet(s) Oral. Every day.        [DISCONTINUED] pitavastatin (LIVALO) 4 mg Tab Take 1 tablet by mouth once daily. 90 tablet 3     No current facility-administered medications on file prior to visit.      Social History:     Social History   Substance Use Topics    Smoking status: Never Smoker    Smokeless tobacco: Never Used    Alcohol use Yes      Comment: social drinker     Family History:     Family History   Problem Relation Age of Onset    Heart failure Mother     Cataracts Mother     Heart disease Father     Hypertension Father     Alzheimer's disease Sister     No Known Problems Brother     No Known Problems Sister     No Known Problems Sister     Heart attack Maternal Uncle     Cataracts Maternal Uncle     Cataracts Maternal Grandmother     No Known Problems Maternal Aunt     No Known Problems Paternal Aunt     No " "Known Problems Paternal Uncle     No Known Problems Maternal Grandfather     No Known Problems Paternal Grandmother     No Known Problems Paternal Grandfather     Amblyopia Neg Hx     Blindness Neg Hx     Glaucoma Neg Hx     Macular degeneration Neg Hx     Retinal detachment Neg Hx     Strabismus Neg Hx     Cancer Neg Hx     Diabetes Neg Hx     Stroke Neg Hx     Thyroid disease Neg Hx      Physical Exam:   BP (!) 188/76 (BP Location: Left arm, Patient Position: Sitting, BP Method: Large (Manual))   Pulse 60   Ht 5' 5" (1.651 m)   Wt 134 kg (295 lb 6.7 oz)   SpO2 95%   BMI 49.16 kg/m²    Physical Exam   Constitutional: She is oriented to person, place, and time. She appears well-developed and well-nourished.   HENT:   Head: Normocephalic and atraumatic.   Eyes: EOM are normal. Pupils are equal, round, and reactive to light.   Neck: Normal range of motion. Neck supple.   Cardiovascular: Normal rate, regular rhythm and intact distal pulses.    Murmur (2/6 cresecendo-descendo systolic murmur heard in right intercostal space (followed by a cardiologist at [facility]) ) heard.  Carotid bruits heard bilaterally worse on left than right  DP 2+ and PT 2+   Pulmonary/Chest: Effort normal and breath sounds normal.   Abdominal: Soft. Bowel sounds are normal. There is no tenderness.   Musculoskeletal: Normal range of motion. She exhibits edema (2+ edema bilaterally) and tenderness.   Brownish discoloration noted bilaterally. Evidence of venous stasis on her right leg anteriorly worse than left.    Neurological: She is alert and oriented to person, place, and time. She has normal reflexes. She exhibits normal muscle tone.   Vitals reviewed.      Labs:     Lab Results   Component Value Date     01/06/2017    K 5.0 01/06/2017     01/06/2017    CO2 27 01/06/2017    BUN 44 (H) 01/06/2017    CREATININE 2.2 (H) 01/06/2017    ANIONGAP 8 01/06/2017     Lab Results   Component Value Date    HGBA1C 6.0 (H) " 07/19/2017     Lab Results   Component Value Date    BNP 95 02/05/2014     (H) 01/06/2011    BNP 43 02/03/2009    Lab Results   Component Value Date    WBC 6.55 07/19/2017    HGB 9.8 (L) 07/19/2017    HCT 31.4 (L) 07/19/2017     07/19/2017    GRAN 4.7 07/19/2017    GRAN 71.1 07/19/2017     Lab Results   Component Value Date    CHOL 184 07/29/2015    HDL 39 (L) 07/29/2015    LDLCALC 115.0 07/29/2015    TRIG 150 07/29/2015          Imaging:     Previous echo on file documented above    EKG: NSR with normal axis  Assessment:     1. Diabetic polyneuropathy associated with type 2 diabetes mellitus    2. Venous stasis dermatitis of both lower extremities    3. Bilateral leg edema    4. Obesity, morbid, BMI 40.0-49.9    5. Uncontrolled type 2 diabetes mellitus with diabetic nephropathy, with long-term current use of insulin      Patient with skin changes c/w resolving cellulitis superimposed upon early stages of venous stasis. Her leg edema complicates matters further. Will not be able to due compression stockings due to her peripheral neuropathy. Emphasized leg elevation and taking her lasix at this time. It is reassuring that her symptoms are improving with these measures. Along with improved BGL control, this should help her skin discoloration at this time.    Plan:   Venous stasis dermatitis of both lower extremities    Bilateral leg edema    Continue conservative management indicated above.    Discussed with Dr. Pettit. RTC as needed.     Signed:  Jennifer Chiu MD  9/5/2017 3:45 PM      There is bilateral edema which is likely causative or contributory to the hyperpigmented changes of the right lower leg. The possible episode of cellulitis may also contribute here. Her diabetic nephropathy with nearly 2g proteinuria also contributes to her volume overload although her albumin and total protein are not significantly reduced. She should continue with daily furosemide to mitigate her edema. I explained  that it may take months for the hyperpigmentation to resolve.  In the meantime she should elevate her feet whenever possible, no dangling of feet.  Continue tight control of DM.  Weight loss- I see no clinical evidence of DVT although her obesity predisposes her to it.  If it fails to resolve would consider venous ultrasound with insufficiency study.

## 2017-09-12 ENCOUNTER — PATIENT MESSAGE (OUTPATIENT)
Dept: ENDOCRINOLOGY | Facility: CLINIC | Age: 62
End: 2017-09-12

## 2017-09-12 DIAGNOSIS — E11.42 DIABETIC POLYNEUROPATHY ASSOCIATED WITH TYPE 2 DIABETES MELLITUS: Primary | ICD-10-CM

## 2017-09-13 ENCOUNTER — PATIENT MESSAGE (OUTPATIENT)
Dept: ENDOCRINOLOGY | Facility: CLINIC | Age: 62
End: 2017-09-13

## 2017-09-13 RX ORDER — GABAPENTIN 300 MG/1
300 CAPSULE ORAL NIGHTLY
Qty: 30 CAPSULE | Refills: 5 | Status: SHIPPED | OUTPATIENT
Start: 2017-09-13 | End: 2018-01-31

## 2017-09-20 DIAGNOSIS — E11.9 TYPE 2 DIABETES MELLITUS WITHOUT COMPLICATION: ICD-10-CM

## 2017-09-21 RX ORDER — BLOOD SUGAR DIAGNOSTIC
STRIP MISCELLANEOUS
Qty: 200 STRIP | Refills: 5 | Status: SHIPPED | OUTPATIENT
Start: 2017-09-21 | End: 2017-09-21 | Stop reason: SDUPTHER

## 2017-10-03 DIAGNOSIS — E11.630 TYPE 2 DIABETES MELLITUS WITH PERIODONTAL DISEASE, WITHOUT LONG-TERM CURRENT USE OF INSULIN: ICD-10-CM

## 2017-10-04 ENCOUNTER — OFFICE VISIT (OUTPATIENT)
Dept: OPTOMETRY | Facility: CLINIC | Age: 62
End: 2017-10-04
Payer: COMMERCIAL

## 2017-10-04 DIAGNOSIS — E11.3593 PROLIFERATIVE DIABETIC RETINOPATHY OF BOTH EYES WITHOUT MACULAR EDEMA ASSOCIATED WITH TYPE 2 DIABETES MELLITUS: Primary | ICD-10-CM

## 2017-10-04 DIAGNOSIS — H40.1132 PRIMARY OPEN ANGLE GLAUCOMA OF BOTH EYES, MODERATE STAGE: ICD-10-CM

## 2017-10-04 PROCEDURE — 92012 INTRM OPH EXAM EST PATIENT: CPT | Mod: S$GLB,,, | Performed by: OPTOMETRIST

## 2017-10-04 PROCEDURE — 99999 PR PBB SHADOW E&M-EST. PATIENT-LVL II: CPT | Mod: PBBFAC,,, | Performed by: OPTOMETRIST

## 2017-10-04 NOTE — PROGRESS NOTES
HPI     Blur od at dist, x mos, no assoc pain or red, no relief over time,   constant  Uses tear drops occasionally    Last edited by Kraig Dimas, OD on 10/4/2017  2:50 PM. (History)            Assessment /Plan     For exam results, see Encounter Report.    Proliferative diabetic retinopathy of both eyes without macular edema associated with type 2 diabetes mellitus    Primary open angle glaucoma of both eyes, moderate stage      1. Reschedule follow up with Saurabh for eval of macula.  2. IOP even lower today, fine for CD, hold off on treatment,  Will recheck in 4 mos, if still up will restart on Brimonidine. Prev stopped by Dr. CEE after cat surgery when IOP came down. Pachy thick, no family history of glaucoma, OCT with thinning OU and VF with defects, some relation to PRP laser for PDR in past. Pachy thick.

## 2017-10-05 ENCOUNTER — TELEPHONE (OUTPATIENT)
Dept: OPHTHALMOLOGY | Facility: CLINIC | Age: 62
End: 2017-10-05

## 2017-10-11 ENCOUNTER — PATIENT MESSAGE (OUTPATIENT)
Dept: ENDOCRINOLOGY | Facility: CLINIC | Age: 62
End: 2017-10-11

## 2017-10-11 DIAGNOSIS — G62.9 NEUROPATHY: Primary | ICD-10-CM

## 2017-10-13 RX ORDER — AMITRIPTYLINE HYDROCHLORIDE 25 MG/1
25 TABLET, FILM COATED ORAL NIGHTLY
Qty: 30 TABLET | Refills: 2 | Status: SHIPPED | OUTPATIENT
Start: 2017-10-13 | End: 2018-01-31

## 2017-12-27 ENCOUNTER — TELEPHONE (OUTPATIENT)
Dept: NEUROLOGY | Facility: CLINIC | Age: 62
End: 2017-12-27

## 2017-12-29 ENCOUNTER — PATIENT OUTREACH (OUTPATIENT)
Dept: ADMINISTRATIVE | Facility: HOSPITAL | Age: 62
End: 2017-12-29

## 2017-12-29 NOTE — PROGRESS NOTES
Health Maintenance Due   Topic Date Due    Hepatitis C Screening  1955    TETANUS VACCINE  10/19/1971    Pap Smear with HPV Cotest  10/19/1974    Mammogram  10/19/1993    Lipid Panel  07/29/2016    Urine Microalbumin  12/07/2016    Colonoscopy  07/20/2017

## 2018-01-02 ENCOUNTER — PATIENT MESSAGE (OUTPATIENT)
Dept: ADMINISTRATIVE | Facility: HOSPITAL | Age: 63
End: 2018-01-02

## 2018-01-08 ENCOUNTER — PROCEDURE VISIT (OUTPATIENT)
Dept: OPHTHALMOLOGY | Facility: CLINIC | Age: 63
End: 2018-01-08
Attending: OPHTHALMOLOGY
Payer: COMMERCIAL

## 2018-01-08 VITALS — HEART RATE: 63 BPM | SYSTOLIC BLOOD PRESSURE: 161 MMHG | DIASTOLIC BLOOD PRESSURE: 57 MMHG

## 2018-01-08 DIAGNOSIS — H35.033 HYPERTENSIVE RETINOPATHY OF BOTH EYES: ICD-10-CM

## 2018-01-08 DIAGNOSIS — Z79.4 CONTROLLED TYPE 2 DIABETES MELLITUS WITH BOTH EYES AFFECTED BY PROLIFERATIVE RETINOPATHY AND MACULAR EDEMA, WITH LONG-TERM CURRENT USE OF INSULIN: Primary | ICD-10-CM

## 2018-01-08 DIAGNOSIS — H40.053 BILATERAL OCULAR HYPERTENSION: ICD-10-CM

## 2018-01-08 DIAGNOSIS — E11.3513 CONTROLLED TYPE 2 DIABETES MELLITUS WITH BOTH EYES AFFECTED BY PROLIFERATIVE RETINOPATHY AND MACULAR EDEMA, WITH LONG-TERM CURRENT USE OF INSULIN: Primary | ICD-10-CM

## 2018-01-08 PROCEDURE — 92014 COMPRE OPH EXAM EST PT 1/>: CPT | Mod: S$GLB,,, | Performed by: OPHTHALMOLOGY

## 2018-01-08 PROCEDURE — 92134 CPTRZ OPH DX IMG PST SGM RTA: CPT | Mod: S$GLB,,, | Performed by: OPHTHALMOLOGY

## 2018-01-08 PROCEDURE — 92226 PR SPECIAL EYE EXAM, SUBSEQUENT: CPT | Mod: RT,S$GLB,, | Performed by: OPHTHALMOLOGY

## 2018-01-08 RX ORDER — TRIAMCINOLONE ACETONIDE 1 MG/G
CREAM TOPICAL
COMMUNITY
Start: 2017-11-01

## 2018-01-08 RX ORDER — METOLAZONE 5 MG/1
TABLET ORAL
COMMUNITY
Start: 2017-12-17

## 2018-01-08 NOTE — PROGRESS NOTES
HPI     DLS  8/21/17  Pt states No changes in her VA noted.   + DM since mid 90's  Eye meds: None  S/P PC IOL OU Dr. Coleman      Last edited by Karolina Cuevas on 1/8/2018  2:12 PM. (History)        OCT - OD DME  OS - No ME      A/P    1.PDR OD - s/p PRP Mod NPDR OS  T2 - controlled  WIth ME OU -   S/p Avastin OD x 2    Low grade paracentral ME OD - continue to monitor  Foveal MA's not amenable to focal, may need injections if worsens    2. HTN Ret OU  BS/BP/chol control    3. PCIOL OU        6 months OCT

## 2018-01-31 ENCOUNTER — OFFICE VISIT (OUTPATIENT)
Dept: NEUROLOGY | Facility: CLINIC | Age: 63
End: 2018-01-31
Payer: COMMERCIAL

## 2018-01-31 VITALS
HEART RATE: 55 BPM | WEIGHT: 285.5 LBS | BODY MASS INDEX: 47.57 KG/M2 | HEIGHT: 65 IN | SYSTOLIC BLOOD PRESSURE: 171 MMHG | DIASTOLIC BLOOD PRESSURE: 59 MMHG

## 2018-01-31 DIAGNOSIS — E11.42 DIABETIC POLYNEUROPATHY ASSOCIATED WITH TYPE 2 DIABETES MELLITUS: Primary | ICD-10-CM

## 2018-01-31 DIAGNOSIS — N18.30 CHRONIC KIDNEY DISEASE, STAGE III (MODERATE): ICD-10-CM

## 2018-01-31 PROCEDURE — 99203 OFFICE O/P NEW LOW 30 MIN: CPT | Mod: S$GLB,,, | Performed by: PSYCHIATRY & NEUROLOGY

## 2018-01-31 PROCEDURE — 3008F BODY MASS INDEX DOCD: CPT | Mod: S$GLB,,, | Performed by: PSYCHIATRY & NEUROLOGY

## 2018-01-31 PROCEDURE — 99999 PR PBB SHADOW E&M-EST. PATIENT-LVL III: CPT | Mod: PBBFAC,,, | Performed by: PSYCHIATRY & NEUROLOGY

## 2018-01-31 NOTE — PROGRESS NOTES
Patient Name: Rochelle Reed  MRN: [id number]    CC: DM PN    HPI: Rochelle Reed is a 62 y.o. female with PN in the setting of diabetes. Has had numbness for some time, but last summer was experiencing severe LE lancinating pains in the setting of increased glucose and fluid retention.     Tried gabapentin, helped with pain, but gained weight. Changed to elavil - three days, but moods were dark. So stopped.     Put herself on Bcomplex and ALA. Neuropathy started to improve.    Found a rich cream and used that.    Stasis dermatitis - steroid cream. Pain improved and legs looked normal.    No sharp/stabbing pains since that time.    Also worked on reducing blood sugar.     Prior medications:   Gabapentin 300mg qHS      QI:  EtOH abuse: No  DM: Yes  Falls: No  EDX: No  FHX: No  Smoking: No  HLD: Yes  Pain limiting activities/sleep: No    LABS w/in the last three months?   Fasting glucose - no  A1C - 6.0 on 7/19/17  2hr GTT - no  B12 - 904 in 2012  MMA - no  SPEP - no  IMFIX - no      Review of Systems    Past Medical History  Past Medical History:   Diagnosis Date    Cataract cortical, senile     CHF (congestive heart failure)     Coronary artery disease     Diabetic nephropathy     DR (diabetic retinopathy)     Hyperlipidemia     Hypertension     Hypothyroid     Morbid obesity 7/26/2012    Obesity     PN (peripheral neuropathy)     Primary open-angle glaucoma, moderate stage 9/30/2015    Type 2 diabetes mellitus not at goal        Medications    Current Outpatient Prescriptions:     alpha lipoic acid 200 mg Cap, Take 200 mg by mouth 2 (two) times daily., Disp: , Rfl:     amlodipine (NORVASC) 5 MG tablet, Take 5 mg by mouth once daily., Disp: , Rfl:     aspirin 325 MG tablet, 1 Tablet(s) Oral. Every day.  , Disp: , Rfl:     atorvastatin (LIPITOR) 40 MG tablet, Take 40 mg by mouth once daily., Disp: , Rfl:     b complex vitamins tablet, Take 1 tablet by mouth once daily., Disp: , Rfl:     blood sugar  "diagnostic (ONETOUCH ULTRA TEST) Strp, TEST BLOOD SUGAR 4-6 TIMES DAILY, Disp: 200 strip, Rfl: 5    calcitRIOL (ROCALTROL) 0.25 MCG Cap, Take 0.25 mcg by mouth 3 (three) times a week., Disp: , Rfl:     carvedilol (COREG) 6.25 MG tablet, Take 6.25 mg by mouth 2 (two) times daily. , Disp: , Rfl:     clopidogrel (PLAVIX) 75 mg tablet, Take 75 mg by mouth once daily., Disp: , Rfl:     coenzyme Q10 (CO Q-10) 200 mg capsule, 1 Capsule(s) Oral.  Twice a day.  , Disp: , Rfl:     EPOETIN LINDSEY (PROCRIT INJ), Inject as directed. Once  mon., Disp: , Rfl:     ergocalciferol (ERGOCALCIFEROL) 50,000 unit Cap, , Disp: , Rfl:     ferrous sulfate 325 mg (65 mg iron) Tab tablet, Take 1 tablet (325 mg total) by mouth once daily., Disp: 30 tablet, Rfl: 0    FLUCELVAX QUAD 2893-3803, PF, 60 mcg (15 mcg x 4)/0.5 mL Syrg vaccine, , Disp: , Rfl:     furosemide (LASIX) 40 MG tablet, TAKE 1 TABLET BY MOUTH ONCE DAILY, Disp: 90 tablet, Rfl: 0    insulin aspart (NOVOLOG FLEXPEN) 100 unit/mL InPn pen, INJECT 25 UNITS AT BREAKFAST AND LUNCH AND 35 UNITS AT DINNER, Disp: 30 mL, Rfl: 6    insulin detemir (LEVEMIR FLEXTOUCH) 100 unit/mL (3 mL) SubQ InPn pen, INJECT 55 UNITS UNDER THE SKIN TWICE DAILY OR AS DIRECTED, Disp: 45 mL, Rfl: 6    levothyroxine (SYNTHROID) 100 MCG tablet, TAKE 1 TABLET(100 MCG) BY MOUTH EVERY DAY, Disp: 90 tablet, Rfl: 2    metOLazone (ZAROXOLYN) 5 MG tablet, , Disp: , Rfl:     NITROSTAT 0.3 mg SL tablet, , Disp: , Rfl:     omega-3 fatty acids 1,000 mg Cap, Take 1 capsule by mouth Daily. 1 Capsule Oral Every day, Disp: , Rfl:     pen needle, diabetic (NOVOFINE 30) 30 gauge x 1/3" Ndle, USE TO INJECT 5 TIMES DAILY, Disp: 150 each, Rfl: 9    triamcinolone acetonide 0.1% (KENALOG) 0.1 % cream, , Disp: , Rfl:     Allergies  Review of patient's allergies indicates:   Allergen Reactions    Ketoconazole      Other reaction(s): Swelling       Social History  Social History     Social History    Marital status: " "     Spouse name: N/A    Number of children: N/A    Years of education: N/A     Occupational History    Not on file.     Social History Main Topics    Smoking status: Never Smoker    Smokeless tobacco: Never Used    Alcohol use Yes      Comment: social drinker    Drug use: No    Sexual activity: Yes     Partners: Male     Other Topics Concern    Not on file     Social History Narrative    No narrative on file       Family History  Family History   Problem Relation Age of Onset    Heart failure Mother     Cataracts Mother     Heart disease Father     Hypertension Father     Alzheimer's disease Sister     No Known Problems Brother     No Known Problems Sister     No Known Problems Sister     Heart attack Maternal Uncle     Cataracts Maternal Uncle     Cataracts Maternal Grandmother     No Known Problems Maternal Aunt     No Known Problems Paternal Aunt     No Known Problems Paternal Uncle     No Known Problems Maternal Grandfather     No Known Problems Paternal Grandmother     No Known Problems Paternal Grandfather     Amblyopia Neg Hx     Blindness Neg Hx     Glaucoma Neg Hx     Macular degeneration Neg Hx     Retinal detachment Neg Hx     Strabismus Neg Hx     Cancer Neg Hx     Diabetes Neg Hx     Stroke Neg Hx     Thyroid disease Neg Hx        Physical Exam  BP (!) 171/59   Pulse (!) 55   Ht 5' 5" (1.651 m)   Wt 129.5 kg (285 lb 7.9 oz)   BMI 47.51 kg/m²     General appearance: Well-developed, well-groomed.     Neurologic Exam: The patient is awake, alert and oriented. Language is fluent. Recent and remote memory are normal. Attention span and concentration are normal. Fund of knowledge is appropriate.     Cranial nerves: pupils are round and reactive to light and accommodation. Visual fields are full to confrontation. Fundoscopic exam reveals sharp discs bilaterally, with good venous pulsations. Ocular motility is full in all cardinal positions of gaze. Facial " sensation is normal to pinprick and light touch. Corneal reflexes are present bilaterally. Facial activation is symmetric. Hearing is normal bilaterally. Palate elevates symmetrically and gag reflex is intact bilaterally. Shoulder elevation is symmetric and full strength bilaterally. Tongue is midline and neck rotation strength is normal bilaterally. Neck range of motion is normal.     Motor examination of all extremities demonstrates normal bulk and tone in all four limbs. There are no atrophy or fasciculations. Strength is 5/5 in the upper and lower extremities bilaterally without pronator drift.     Sensory examination is normal to pinprick, vibration and proprioception in the upper and lower extremities bilaterally. Romberg is negative.    Deep tendon reflexes are 2+ and symmetric in the upper and lower extremities bilaterally. Toes are mute bilaterally.     Gait: Normal heel, toe, tandem, and casual gait.    Coordination: Finger to nose and heel to shin testing is normal in both upper and lower extremities. Rapid alternating movements are normal in both upper and lower extremities.     General exam  Cardiovascular: regular rate and rhythm with no murmurs, rubs or gallops. There are no carotid or vertebral artery bruits. Pulses in both upper and lower extremities are symmetric. There is no peripheral edema.   Head and neck: no cervical lymphadenopathy      Lab and Test Results    WBC   Date Value Ref Range Status   07/19/2017 6.55 3.90 - 12.70 K/uL Final   10/31/2012 5.98 4.8 - 10.8 K/uL Final   09/25/2012 5.77 4.8 - 10.8 K/uL Final     Hemoglobin   Date Value Ref Range Status   07/19/2017 9.8 (L) 12.0 - 16.0 g/dL Final   10/31/2012 9.8 (L) 12.0 - 16.0 gm/dl Final   09/25/2012 10.2 (L) 12.0 - 16.0 gm/dl Final     Hematocrit   Date Value Ref Range Status   07/19/2017 31.4 (L) 37.0 - 48.5 % Final   01/28/2013 31.1 (L) 37.0 - 48.5 % Final   10/31/2012 31.2 (L) 37.0 - 48.5 % Final     Platelets   Date Value Ref  Range Status   07/19/2017 217 150 - 350 K/uL Final   10/31/2012 221 150 - 350 K/uL Final   09/25/2012 275 150 - 350 K/uL Final     Glucose   Date Value Ref Range Status   01/06/2017 151 (H) 70 - 110 mg/dL Final   04/05/2016 160 (H) 70 - 110 mg/dL Final   07/29/2015 148 (H) 70 - 110 mg/dL Final     Sodium   Date Value Ref Range Status   01/06/2017 140 136 - 145 mmol/L Final   04/05/2016 140 136 - 145 mmol/L Final   07/29/2015 140 136 - 145 mmol/L Final     Potassium   Date Value Ref Range Status   01/06/2017 5.0 3.5 - 5.1 mmol/L Final   04/05/2016 4.6 3.5 - 5.1 mmol/L Final   07/29/2015 5.2 (H) 3.5 - 5.1 mmol/L Final     Chloride   Date Value Ref Range Status   01/06/2017 105 95 - 110 mmol/L Final   04/05/2016 106 95 - 110 mmol/L Final   07/29/2015 109 95 - 110 mmol/L Final     CO2   Date Value Ref Range Status   01/06/2017 27 23 - 29 mmol/L Final   04/05/2016 24 23 - 29 mmol/L Final   07/29/2015 25 23 - 29 mmol/L Final     BUN, Bld   Date Value Ref Range Status   01/06/2017 44 (H) 8 - 23 mg/dL Final   04/05/2016 45 (H) 8 - 23 mg/dL Final   07/29/2015 47 (H) 8 - 23 mg/dL Final     Creatinine   Date Value Ref Range Status   01/06/2017 2.2 (H) 0.5 - 1.4 mg/dL Final   04/05/2016 2.4 (H) 0.5 - 1.4 mg/dL Final   07/29/2015 1.9 (H) 0.5 - 1.4 mg/dL Final     Calcium   Date Value Ref Range Status   01/06/2017 9.2 8.7 - 10.5 mg/dL Final   04/05/2016 9.4 8.7 - 10.5 mg/dL Final   07/29/2015 9.5 8.7 - 10.5 mg/dL Final     Magnesium   Date Value Ref Range Status   10/31/2012 2.3 1.6 - 2.6 mg/dl Final   09/25/2012 2.0 1.6 - 2.6 mg/dl Final   09/09/2012 2.2 1.6 - 2.6 mg/dl Final     Phosphorus   Date Value Ref Range Status   06/27/2014 5.1 (H) 2.7 - 4.5 mg/dL Final   05/21/2013 5.3 (H) 2.7 - 4.5 mg/dl Final   10/31/2012 5.9 (H) 2.7 - 4.5 mg/dl Final     Alkaline Phosphatase   Date Value Ref Range Status   01/06/2017 37 (L) 55 - 135 U/L Final   04/05/2016 37 (L) 55 - 135 U/L Final   07/29/2015 28 (L) 55 - 135 U/L Final     ALT    Date Value Ref Range Status   01/06/2017 10 10 - 44 U/L Final   04/05/2016 12 10 - 44 U/L Final   07/29/2015 13 10 - 44 U/L Final     AST   Date Value Ref Range Status   01/06/2017 13 10 - 40 U/L Final   04/05/2016 14 10 - 40 U/L Final   07/29/2015 15 10 - 40 U/L Final       Assessment and Plan    Problem List Items Addressed This Visit     Diabetic polyneuropathy associated with type 2 diabetes mellitus - Primary    Current Assessment & Plan     Symptoms have resolved.   Will be happy to see her should symptoms return.         Chronic kidney disease, stage III (moderate)              Esvin Hidalgo MD, TK, FAAN  Department of Neurology  [facility] [place] [address]

## 2018-05-19 DIAGNOSIS — E03.9 HYPOTHYROIDISM: ICD-10-CM

## 2018-05-21 RX ORDER — LEVOTHYROXINE SODIUM 100 UG/1
TABLET ORAL
Qty: 90 TABLET | Refills: 0 | Status: SHIPPED | OUTPATIENT
Start: 2018-05-21

## 2018-07-18 RX ORDER — INSULIN ASPART 100 [IU]/ML
INJECTION, SOLUTION INTRAVENOUS; SUBCUTANEOUS
Qty: 30 ML | Refills: 1 | Status: SHIPPED | OUTPATIENT
Start: 2018-07-18

## 2018-10-03 DIAGNOSIS — E11.630 TYPE 2 DIABETES MELLITUS WITH PERIODONTAL DISEASE, WITHOUT LONG-TERM CURRENT USE OF INSULIN: ICD-10-CM

## 2018-10-04 RX ORDER — INSULIN DETEMIR 100 [IU]/ML
INJECTION, SOLUTION SUBCUTANEOUS
Qty: 45 ML | Refills: 0 | Status: SHIPPED | OUTPATIENT
Start: 2018-10-04 | End: 2018-11-30 | Stop reason: SDUPTHER

## 2018-11-30 DIAGNOSIS — E11.630 TYPE 2 DIABETES MELLITUS WITH PERIODONTAL DISEASE, WITHOUT LONG-TERM CURRENT USE OF INSULIN: ICD-10-CM

## 2018-11-30 RX ORDER — INSULIN DETEMIR 100 [IU]/ML
INJECTION, SOLUTION SUBCUTANEOUS
Qty: 45 ML | Refills: 0 | Status: SHIPPED | OUTPATIENT
Start: 2018-11-30

## 2020-03-24 NOTE — LETTER
September 5, 2017      Michael Olmstead MD  [address]  [facility] [address]           [facility]  [address]  [facility] [address]  Phone: [phone]          Patient: Rochelle Reed   MR Number: [id number]   YOB: 1955   Date of Visit: 9/5/2017       Dear Dr. Michael Olmstead:    Thank you for referring Rochelle Reed to me for evaluation. Attached you will find relevant portions of my assessment and plan of care.    If you have questions, please do not hesitate to call me. I look forward to following Rochelle Reed along with you.    Sincerely,    Jose Pettit MD    Enclosure  CC:  No Recipients    If you would like to receive this communication electronically, please contact [email or web address] or [phone] to request more information on [employer] access.    For providers and/or their staff who would like to refer a patient to [facility], please contact us through our one-stop-shop provider referral line, [facility], at [phone].    If you feel you have received this communication in error or would no longer like to receive these types of communications, please e-mail [email or web address]          abdominal pain/constipation